# Patient Record
Sex: FEMALE | NOT HISPANIC OR LATINO | Employment: PART TIME
[De-identification: names, ages, dates, MRNs, and addresses within clinical notes are randomized per-mention and may not be internally consistent; named-entity substitution may affect disease eponyms.]

---
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
## 2024-03-04 ENCOUNTER — APPOINTMENT (OUTPATIENT)
Dept: CT IMAGING | Facility: CLINIC | Age: 19
End: 2024-03-04
Attending: EMERGENCY MEDICINE
Payer: COMMERCIAL

## 2024-03-04 ENCOUNTER — HOSPITAL ENCOUNTER (EMERGENCY)
Facility: CLINIC | Age: 19
Discharge: HOME | End: 2024-03-04
Attending: EMERGENCY MEDICINE | Admitting: EMERGENCY MEDICINE
Payer: COMMERCIAL

## 2024-03-04 ENCOUNTER — APPOINTMENT (OUTPATIENT)
Dept: GENERAL RADIOLOGY | Facility: CLINIC | Age: 19
End: 2024-03-04
Attending: EMERGENCY MEDICINE
Payer: COMMERCIAL

## 2024-03-04 VITALS
RESPIRATION RATE: 14 BRPM | HEART RATE: 90 BPM | WEIGHT: 210 LBS | TEMPERATURE: 100.3 F | DIASTOLIC BLOOD PRESSURE: 80 MMHG | OXYGEN SATURATION: 100 % | SYSTOLIC BLOOD PRESSURE: 107 MMHG

## 2024-03-04 DIAGNOSIS — A41.9 SEPSIS, DUE TO UNSPECIFIED ORGANISM, UNSPECIFIED WHETHER ACUTE ORGAN DYSFUNCTION PRESENT (H): ICD-10-CM

## 2024-03-04 DIAGNOSIS — J03.90 TONSILLITIS: ICD-10-CM

## 2024-03-04 LAB
ALBUMIN SERPL BCG-MCNC: 4.5 G/DL (ref 3.5–5.2)
ALBUMIN UR-MCNC: 10 MG/DL
ALP SERPL-CCNC: 87 U/L (ref 40–150)
ALT SERPL W P-5'-P-CCNC: 28 U/L (ref 0–50)
ANION GAP SERPL CALCULATED.3IONS-SCNC: 16 MMOL/L (ref 7–15)
APPEARANCE UR: ABNORMAL
AST SERPL W P-5'-P-CCNC: 24 U/L (ref 0–35)
BASOPHILS # BLD AUTO: 0.1 10E3/UL (ref 0–0.2)
BASOPHILS NFR BLD AUTO: 0 %
BILIRUB SERPL-MCNC: 0.8 MG/DL
BILIRUB UR QL STRIP: NEGATIVE
BUN SERPL-MCNC: 5 MG/DL (ref 6–20)
CALCIUM SERPL-MCNC: 9.4 MG/DL (ref 8.6–10)
CHLORIDE SERPL-SCNC: 101 MMOL/L (ref 98–107)
COLOR UR AUTO: ABNORMAL
CREAT SERPL-MCNC: 0.81 MG/DL (ref 0.51–0.95)
DEPRECATED HCO3 PLAS-SCNC: 19 MMOL/L (ref 22–29)
EGFRCR SERPLBLD CKD-EPI 2021: >90 ML/MIN/1.73M2
EOSINOPHIL # BLD AUTO: 0 10E3/UL (ref 0–0.7)
EOSINOPHIL NFR BLD AUTO: 0 %
ERYTHROCYTE [DISTWIDTH] IN BLOOD BY AUTOMATED COUNT: 14.9 % (ref 10–15)
FLUAV RNA SPEC QL NAA+PROBE: NEGATIVE
FLUBV RNA RESP QL NAA+PROBE: NEGATIVE
GLUCOSE SERPL-MCNC: 106 MG/DL (ref 70–99)
GLUCOSE UR STRIP-MCNC: NEGATIVE MG/DL
GROUP A STREP BY PCR: NOT DETECTED
HCG UR QL: NEGATIVE
HCT VFR BLD AUTO: 35.5 % (ref 35–47)
HGB BLD-MCNC: 12.1 G/DL (ref 11.7–15.7)
HGB UR QL STRIP: NEGATIVE
IMM GRANULOCYTES # BLD: 0.1 10E3/UL
IMM GRANULOCYTES NFR BLD: 1 %
KETONES UR STRIP-MCNC: NEGATIVE MG/DL
LACTATE SERPL-SCNC: 0.7 MMOL/L (ref 0.7–2)
LEUKOCYTE ESTERASE UR QL STRIP: ABNORMAL
LIPASE SERPL-CCNC: 16 U/L (ref 13–60)
LYMPHOCYTES # BLD AUTO: 1.2 10E3/UL (ref 0.8–5.3)
LYMPHOCYTES NFR BLD AUTO: 5 %
MCH RBC QN AUTO: 25.9 PG (ref 26.5–33)
MCHC RBC AUTO-ENTMCNC: 34.1 G/DL (ref 31.5–36.5)
MCV RBC AUTO: 76 FL (ref 78–100)
MONOCYTES # BLD AUTO: 1.3 10E3/UL (ref 0–1.3)
MONOCYTES NFR BLD AUTO: 6 %
MUCOUS THREADS #/AREA URNS LPF: PRESENT /LPF
NEUTROPHILS # BLD AUTO: 20.5 10E3/UL (ref 1.6–8.3)
NEUTROPHILS NFR BLD AUTO: 88 %
NITRATE UR QL: NEGATIVE
NRBC # BLD AUTO: 0 10E3/UL
NRBC BLD AUTO-RTO: 0 /100
PH UR STRIP: 6.5 [PH] (ref 5–7)
PLATELET # BLD AUTO: 324 10E3/UL (ref 150–450)
POTASSIUM SERPL-SCNC: 3.4 MMOL/L (ref 3.4–5.3)
PROCALCITONIN SERPL IA-MCNC: 0.15 NG/ML
PROT SERPL-MCNC: 7.9 G/DL (ref 6.4–8.3)
RBC # BLD AUTO: 4.68 10E6/UL (ref 3.8–5.2)
RBC URINE: 2 /HPF
RSV RNA SPEC NAA+PROBE: NEGATIVE
SARS-COV-2 RNA RESP QL NAA+PROBE: NEGATIVE
SODIUM SERPL-SCNC: 136 MMOL/L (ref 135–145)
SP GR UR STRIP: 1.02 (ref 1–1.03)
SQUAMOUS EPITHELIAL: 9 /HPF
UROBILINOGEN UR STRIP-MCNC: NORMAL MG/DL
WBC # BLD AUTO: 23.2 10E3/UL (ref 4–11)
WBC URINE: 14 /HPF

## 2024-03-04 PROCEDURE — 70491 CT SOFT TISSUE NECK W/DYE: CPT

## 2024-03-04 PROCEDURE — 81025 URINE PREGNANCY TEST: CPT | Performed by: EMERGENCY MEDICINE

## 2024-03-04 PROCEDURE — 96375 TX/PRO/DX INJ NEW DRUG ADDON: CPT | Mod: 59

## 2024-03-04 PROCEDURE — 80053 COMPREHEN METABOLIC PANEL: CPT | Performed by: EMERGENCY MEDICINE

## 2024-03-04 PROCEDURE — 99285 EMERGENCY DEPT VISIT HI MDM: CPT | Mod: 25

## 2024-03-04 PROCEDURE — 250N000013 HC RX MED GY IP 250 OP 250 PS 637: Performed by: EMERGENCY MEDICINE

## 2024-03-04 PROCEDURE — 258N000003 HC RX IP 258 OP 636: Performed by: EMERGENCY MEDICINE

## 2024-03-04 PROCEDURE — 87040 BLOOD CULTURE FOR BACTERIA: CPT | Mod: 91 | Performed by: EMERGENCY MEDICINE

## 2024-03-04 PROCEDURE — 85004 AUTOMATED DIFF WBC COUNT: CPT | Performed by: EMERGENCY MEDICINE

## 2024-03-04 PROCEDURE — 96361 HYDRATE IV INFUSION ADD-ON: CPT

## 2024-03-04 PROCEDURE — 250N000011 HC RX IP 250 OP 636: Performed by: EMERGENCY MEDICINE

## 2024-03-04 PROCEDURE — 87086 URINE CULTURE/COLONY COUNT: CPT | Performed by: EMERGENCY MEDICINE

## 2024-03-04 PROCEDURE — 83605 ASSAY OF LACTIC ACID: CPT | Performed by: EMERGENCY MEDICINE

## 2024-03-04 PROCEDURE — 96365 THER/PROPH/DIAG IV INF INIT: CPT

## 2024-03-04 PROCEDURE — 87651 STREP A DNA AMP PROBE: CPT | Performed by: EMERGENCY MEDICINE

## 2024-03-04 PROCEDURE — 83690 ASSAY OF LIPASE: CPT | Performed by: EMERGENCY MEDICINE

## 2024-03-04 PROCEDURE — 87637 SARSCOV2&INF A&B&RSV AMP PRB: CPT | Performed by: EMERGENCY MEDICINE

## 2024-03-04 PROCEDURE — 71046 X-RAY EXAM CHEST 2 VIEWS: CPT

## 2024-03-04 PROCEDURE — 250N000009 HC RX 250: Performed by: EMERGENCY MEDICINE

## 2024-03-04 PROCEDURE — 36415 COLL VENOUS BLD VENIPUNCTURE: CPT | Performed by: EMERGENCY MEDICINE

## 2024-03-04 PROCEDURE — 84145 PROCALCITONIN (PCT): CPT | Performed by: EMERGENCY MEDICINE

## 2024-03-04 PROCEDURE — 81001 URINALYSIS AUTO W/SCOPE: CPT | Performed by: EMERGENCY MEDICINE

## 2024-03-04 RX ORDER — AMPICILLIN AND SULBACTAM 2; 1 G/1; G/1
3 INJECTION, POWDER, FOR SOLUTION INTRAMUSCULAR; INTRAVENOUS ONCE
Status: COMPLETED | OUTPATIENT
Start: 2024-03-04 | End: 2024-03-04

## 2024-03-04 RX ORDER — IBUPROFEN 600 MG/1
600 TABLET, FILM COATED ORAL ONCE
Status: COMPLETED | OUTPATIENT
Start: 2024-03-04 | End: 2024-03-04

## 2024-03-04 RX ORDER — ACETAMINOPHEN 500 MG
1000 TABLET ORAL ONCE
Status: COMPLETED | OUTPATIENT
Start: 2024-03-04 | End: 2024-03-04

## 2024-03-04 RX ORDER — DEXAMETHASONE SODIUM PHOSPHATE 10 MG/ML
10 INJECTION, SOLUTION INTRAMUSCULAR; INTRAVENOUS ONCE
Status: COMPLETED | OUTPATIENT
Start: 2024-03-04 | End: 2024-03-04

## 2024-03-04 RX ORDER — IOPAMIDOL 755 MG/ML
90 INJECTION, SOLUTION INTRAVASCULAR ONCE
Status: COMPLETED | OUTPATIENT
Start: 2024-03-04 | End: 2024-03-04

## 2024-03-04 RX ADMIN — IOPAMIDOL 90 ML: 755 INJECTION, SOLUTION INTRAVENOUS at 16:55

## 2024-03-04 RX ADMIN — AMPICILLIN SODIUM AND SULBACTAM SODIUM 3 G: 2; 1 INJECTION, POWDER, FOR SOLUTION INTRAMUSCULAR; INTRAVENOUS at 17:48

## 2024-03-04 RX ADMIN — ACETAMINOPHEN 1000 MG: 500 TABLET, FILM COATED ORAL at 12:43

## 2024-03-04 RX ADMIN — SODIUM CHLORIDE 1000 ML: 9 INJECTION, SOLUTION INTRAVENOUS at 14:24

## 2024-03-04 RX ADMIN — SODIUM CHLORIDE 60 ML: 9 INJECTION, SOLUTION INTRAVENOUS at 16:55

## 2024-03-04 RX ADMIN — DEXAMETHASONE SODIUM PHOSPHATE 10 MG: 10 INJECTION INTRAMUSCULAR; INTRAVENOUS at 17:48

## 2024-03-04 RX ADMIN — IBUPROFEN 600 MG: 600 TABLET ORAL at 12:44

## 2024-03-04 ASSESSMENT — ACTIVITIES OF DAILY LIVING (ADL)
ADLS_ACUITY_SCORE: 35

## 2024-03-04 ASSESSMENT — COLUMBIA-SUICIDE SEVERITY RATING SCALE - C-SSRS
1. IN THE PAST MONTH, HAVE YOU WISHED YOU WERE DEAD OR WISHED YOU COULD GO TO SLEEP AND NOT WAKE UP?: NO
6. HAVE YOU EVER DONE ANYTHING, STARTED TO DO ANYTHING, OR PREPARED TO DO ANYTHING TO END YOUR LIFE?: NO
2. HAVE YOU ACTUALLY HAD ANY THOUGHTS OF KILLING YOURSELF IN THE PAST MONTH?: NO

## 2024-03-04 NOTE — ED PROVIDER NOTES
History     Chief Complaint:  Sore throat and fever     HPI   Nina Mayes is a 18 year old female who presents with sore throat. Since last night, 3/3/24, patient has had a sore throat and fever. This morning when measured, patient's temperature was 100.4 Patient feels full body aches. She denies cough, abdominal pain, emesis, diarrhea, exposure to sick individuals, or ear pain. 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 Nina denies the presence of rashes on body. Patient was given an antigen test for Strep Throat previously, which came up negative. She denies possibility of being pregnant.    Additional history obtained later, patient confirms that she has not had any dysuria, urgency or frequency other than drinking more water last night, but did not have dysuria.  She confirms no vaginal discharge.  She confirms no rash in her body.  She confirms no concern for STIs.  She confirms no abdominal or flank pain.  No headache.    Independent Historian:   None - Patient Only    Review of External Notes:   None     Medications:    No current outpatient medications on file.    Past Medical History:    No past medical history on file.    Physical Exam   Patient Vitals for the past 24 hrs:   BP Temp Temp src Pulse Resp SpO2 Weight   03/04/24 1804 107/80 -- -- 90 -- 100 % --   03/04/24 1749 131/76 -- -- -- -- 98 % --   03/04/24 1616 -- -- -- 101 -- 98 % --   03/04/24 1520 (!) 146/75 100.3  F (37.9  C) Oral (!) 121 14 96 % --   03/04/24 1519 (!) 146/75 -- -- 118 -- 95 % --   03/04/24 1217 (!) 159/75 (!) 102.3  F (39.1  C) Oral (!) 137 20 98 % 95.3 kg (210 lb)        Physical Exam  General:  Sitting on bed.  HENT:  No obvious trauma to head. Tonsils are +2.  Mild erythema in the posterior pharynx.  Uvula is midline.  Right Ear:  External ear normal. Tympanic membrane is without erythema or bulging.  Left Ear:  External ear normal. Tympanic membrane is without erythema or bulging.  Nose:  Nose normal.   Eyes:  Conjunctivae  and EOM are normal. Pupils are equal, round, and reactive.   Neck: Normal range of motion. Neck supple. No tracheal deviation present.   CV:  Normal heart sounds. No murmur heard.  Pulm/Chest: Effort normal and breath sounds normal.   Abd: Soft. No distension. There is no tenderness.  No CVA tenderness bilateral.  There is no rigidity, no rebound and no guarding.   M/S: Normal range of motion.   Neuro: Awake and alert.  No meningeal signs  Skin: Skin is warm and dry. No rash noted. Not diaphoretic.   Psych: Normal mood and affect. Behavior is normal.     Emergency Department Course   Imaging:  Soft tissue neck CT w contrast   Final Result   IMPRESSION:    1.  Acute tonsillopharyngitis without significant airway narrowing.   2.  No organized fluid collection to suggest peritonsillar abscess.   3.  Several scattered borderline enlarged bilateral cervical lymph nodes are likely reactive.         XR Chest 2 Views   Final Result   IMPRESSION: There are no acute infiltrates. The cardiac silhouette is   not enlarged. Pulmonary vasculature is unremarkable.      SHERIE WANG MD            SYSTEM ID:  [id number]             Laboratory:  Labs Ordered and Resulted from Time of ED Arrival to Time of ED Departure   ROUTINE UA WITH MICROSCOPIC REFLEX TO CULTURE - Abnormal       Result Value    Color Urine Orange (*)     Appearance Urine Slightly Cloudy (*)     Glucose Urine Negative      Bilirubin Urine Negative      Ketones Urine Negative      Specific Gravity Urine 1.020      Blood Urine Negative      pH Urine 6.5      Protein Albumin Urine 10 (*)     Urobilinogen Urine Normal      Nitrite Urine Negative      Leukocyte Esterase Urine Large (*)     Mucus Urine Present (*)     RBC Urine 2      WBC Urine 14 (*)     Squamous Epithelials Urine 9 (*)    COMPREHENSIVE METABOLIC PANEL - Abnormal    Sodium 136      Potassium 3.4      Carbon Dioxide (CO2) 19 (*)     Anion Gap 16 (*)     Urea Nitrogen 5.0 (*)     Creatinine 0.81      GFR  Estimate >90      Calcium 9.4      Chloride 101      Glucose 106 (*)     Alkaline Phosphatase 87      AST 24      ALT 28      Protein Total 7.9      Albumin 4.5      Bilirubin Total 0.8     CBC WITH PLATELETS AND DIFFERENTIAL - Abnormal    WBC Count 23.2 (*)     RBC Count 4.68      Hemoglobin 12.1      Hematocrit 35.5      MCV 76 (*)     MCH 25.9 (*)     MCHC 34.1      RDW 14.9      Platelet Count 324      % Neutrophils 88      % Lymphocytes 5      % Monocytes 6      % Eosinophils 0      % Basophils 0      % Immature Granulocytes 1      NRBCs per 100 WBC 0      Absolute Neutrophils 20.5 (*)     Absolute Lymphocytes 1.2      Absolute Monocytes 1.3      Absolute Eosinophils 0.0      Absolute Basophils 0.1      Absolute Immature Granulocytes 0.1      Absolute NRBCs 0.0     INFLUENZA A/B, RSV, & SARS-COV2 PCR - Normal    Influenza A PCR Negative      Influenza B PCR Negative      RSV PCR Negative      SARS CoV2 PCR Negative     HCG QUALITATIVE URINE - Normal    hCG Urine Qualitative Negative     LIPASE - Normal    Lipase 16     PROCALCITONIN - Normal    Procalcitonin 0.15     LACTIC ACID WHOLE BLOOD - Normal    Lactic Acid 0.7     GROUP A STREPTOCOCCUS PCR THROAT SWAB - Normal    Group A strep by PCR Not Detected     BLOOD CULTURE   BLOOD CULTURE   URINE CULTURE        Emergency Department Course & Assessments:    Interventions:  Medications   ibuprofen (ADVIL/MOTRIN) tablet 600 mg (600 mg Oral $Given 3/4/24 1244)   acetaminophen (TYLENOL) tablet 1,000 mg (1,000 mg Oral $Given 3/4/24 1243)   sodium chloride 0.9% BOLUS 1,000 mL (0 mLs Intravenous Stopped 3/4/24 1654)   iopamidol (ISOVUE-370) solution 90 mL (90 mLs Intravenous $Given 3/4/24 1655)   Saline Flush (60 mLs Intravenous $Given 3/4/24 1655)   dexAMETHasone PF (DECADRON) injection 10 mg (10 mg Intravenous $Given 3/4/24 1748)   ampicillin-sulbactam (UNASYN) 3 g vial to attach to  mL bag (0 g Intravenous Stopped 3/4/24 1834)      Assessments:  As  below    Independent Interpretation (X-rays, CTs, rhythm strip):  Chest x-ray shows no pneumothorax or infiltrate.    Assessments/Consultations/Discussion of Management or Tests:   ED Course as of 03/04/24 1832   Mon Mar 04, 2024   1255 I obtained history and examined the patient as noted above.     1343 I updated patient with findings.    1612 Patient in ED room, reevaluated and reexamined patient.  Reviewed results and plan of care.  She consented for CT.  She confirms no dysuria, flank pain, abdominal pain, rash, vaginal discharge, headache, cough, or other infectious symptoms at this time.   1819 I re-assessed patient.  Patient reports feeling significantly better.  Reviewed all the results with her.  I discussed the labs, CT findings, etc.  The patient feels safe and desires to go home.  I discussed the rare chance that they may have had a false negative monotest at the clinic.  Discussed that this is the case, and she develops a rash from being on a penicillin derivative antibiotic that she can be retested for mono.       Social Determinants of Health affecting care:   None    Disposition:  The patient was discharged.     Impression & Plan    CMS Diagnoses: None    Medical Decision Making:  Nina Mayes is a very pleasant 18 year old year old patient who presents to the emergency department with concern of a sore throat.  The patient has a high fever.  She went to her clinic today.  At the clinic she was tested for strep and COVID which were both negative.  She also reports having a monotest that was negative.  She reports having blood drawn and had a white blood cell count that was elevated so they recommended she come to the ED for reevaluation.  I cannot see the testing performed, but consider that may have been antigen and strep test were performed and molecular strep test here.  This did return negative.  Influenza, COVID and RSV molecular testing today are negative as well.  She was provided  ibuprofen and Tylenol along with IV fluids and felt much better.  Labs are repeated since her initial molecular testing was negative and given her history of the leukocytosis.  Fortunately, a lactate is normal.  Procalcitonin is negative.  CBC does confirm a persistent leukocytosis.  2 blood cultures were obtained and I reviewed this with the patient that if they return positive in the coming days it should receive a call to return to the ED for repeat evaluation.  The patient's main concern is the sore throat.  Her tonsils were slightly enlarged, but uvula was midline.  Given the leukocytosis and vital signs consistent with sepsis I considered retropharyngeal abscess, epiglottitis or peritonsillar abscess without definitive clinical findings.  She consented for CT.  Fortunately, the CT is negative for any of these but, confirms the pharyngeal tonsillitis.  There is no evidence of epiglottitis or retropharyngeal abscess or peritonsillar abscess.  There is some reactive lymphadenopathy in the cervical anterior chain.  The chest x-ray had been performed as well that showed no evidence of pneumonia.  Urine analysis did not show evidence of UTI.  She does not have any dysuria.  The patient denies any abdominal pain nor did she have any abdominal tenderness on exam so I doubt condition such as appendicitis, cholecystitis, diverticulitis, etc.  She does not have a headache or any meningeal signs to suggest meningitis.  Reevaluated the patient at the end of her ED stay and she felt significant better and felt safe and comfortable going home.  I discussed with her her diagnosis and the importance of close follow-up with primary care physician and continue to take the antibiotics.  I also discussed the possibility that she may have had a false negative monotest since it was tested early on in the course of her illness and that if she develops a rash to the Augmentin, it may be secondary to having mono and she should consider  repeat testing for mono.  She understands this and agrees.    The treatment plan was discussed with the patient and they expressed understanding of this plan and consented to the plan.  In addition, the patient will return to the emergency department if their symptoms persist, worsen, if new symptoms arise or if there is any concern as other pathology may be present that is not evident at this time. They also understand the importance of close follow up in the clinic and if unable to do so will return to the emergency department for a reevaluation. All questions were answered.    Diagnosis:    ICD-10-CM    1. Sepsis, due to unspecified organism, unspecified whether acute organ dysfunction present (H)  A41.9       2. Tonsillitis  J03.90            Discharge Medications:  New Prescriptions    AMOXICILLIN-CLAVULANATE (AUGMENTIN) 875-125 MG TABLET    Take 1 tablet by mouth 2 times daily for 7 days          Scribe Disclosure:  Christ GRIFFITHS, am serving as a scribe at 1:42 PM on 3/4/2024 to document services personally performed by Bentley Duran DO, based on my observations and the provider's statements to me.   3/4/2024   Bentley Duran DO Anderson, Robert James, DO  03/04/24 [id number]

## 2024-03-04 NOTE — LETTER
March 5, 2024      To Whom It May Concern:      [facility]24.  I expect her condition to improve over the next two to three days.  She may return to work/school when improved.    Sincerely,        Prabha DAVIDSON RN, BSN

## 2024-03-04 NOTE — ED TRIAGE NOTES
Sore throat, went to , was neg for strep, covid and mono. Started Saturday night, worse last night. Took tylenol this morning, nyquil last night.

## 2024-03-05 LAB — BACTERIA UR CULT: NORMAL

## 2024-03-09 LAB
BACTERIA BLD CULT: NO GROWTH
BACTERIA BLD CULT: NO GROWTH

## 2024-04-14 ENCOUNTER — HEALTH MAINTENANCE LETTER (OUTPATIENT)
Age: 19
End: 2024-04-14

## 2025-08-23 ENCOUNTER — HEALTH MAINTENANCE LETTER (OUTPATIENT)
Age: 20
End: 2025-08-23